# Patient Record
Sex: FEMALE | Race: WHITE | Employment: OTHER | URBAN - METROPOLITAN AREA
[De-identification: names, ages, dates, MRNs, and addresses within clinical notes are randomized per-mention and may not be internally consistent; named-entity substitution may affect disease eponyms.]

---

## 2017-04-04 ENCOUNTER — OFFICE VISIT (OUTPATIENT)
Dept: HEMATOLOGY/ONCOLOGY | Facility: HOSPITAL | Age: 82
End: 2017-04-04
Attending: INTERNAL MEDICINE
Payer: MEDICARE

## 2017-04-04 VITALS
SYSTOLIC BLOOD PRESSURE: 174 MMHG | RESPIRATION RATE: 18 BRPM | DIASTOLIC BLOOD PRESSURE: 77 MMHG | WEIGHT: 125.81 LBS | TEMPERATURE: 99 F | OXYGEN SATURATION: 94 % | HEART RATE: 72 BPM

## 2017-04-04 DIAGNOSIS — C85.80 LARGE CELL LYMPHOMA (HCC): Primary | ICD-10-CM

## 2017-04-04 RX ORDER — MELATONIN
325 2 TIMES DAILY WITH MEALS
COMMUNITY

## 2017-04-04 RX ORDER — ENOXAPARIN SODIUM 100 MG/ML
40 INJECTION SUBCUTANEOUS DAILY
COMMUNITY
Start: 2017-03-06 | End: 2017-05-02

## 2017-04-04 RX ORDER — LISINOPRIL 20 MG/1
40 TABLET ORAL DAILY
COMMUNITY
Start: 2017-01-28 | End: 2017-07-18

## 2017-04-04 RX ORDER — DILTIAZEM HYDROCHLORIDE 240 MG/1
240 CAPSULE, COATED, EXTENDED RELEASE ORAL DAILY
COMMUNITY
Start: 2017-03-17

## 2017-04-04 RX ORDER — FUROSEMIDE 20 MG/1
40 TABLET ORAL DAILY
COMMUNITY
Start: 2017-02-20 | End: 2017-06-27

## 2017-04-04 RX ORDER — MONTELUKAST SODIUM 10 MG/1
10 TABLET ORAL NIGHTLY
COMMUNITY
Start: 2017-02-27 | End: 2017-05-02

## 2017-04-04 RX ORDER — HYDROCODONE BITARTRATE AND ACETAMINOPHEN 5; 325 MG/1; MG/1
1 TABLET ORAL EVERY 6 HOURS PRN
COMMUNITY
Start: 2017-03-08

## 2017-04-04 RX ORDER — ASPIRIN 81 MG
100 TABLET, DELAYED RELEASE (ENTERIC COATED) ORAL 2 TIMES DAILY
COMMUNITY
End: 2017-05-02

## 2017-04-04 NOTE — PROGRESS NOTES
Patient is here for MD consult. Pt presented with syncopal episode and positive hemoccult. Ct scan showed a mass in the right colon. Right hemicolectomy was done on 3/3. Here to discuss tx options and POC.         Education Record    Learner:  Patient    Zach Ernandez

## 2017-04-05 ENCOUNTER — TELEPHONE (OUTPATIENT)
Dept: HEMATOLOGY/ONCOLOGY | Facility: HOSPITAL | Age: 82
End: 2017-04-05

## 2017-04-05 PROBLEM — I50.30 DIASTOLIC CHF (HCC): Status: ACTIVE | Noted: 2017-04-05

## 2017-04-05 PROBLEM — I10 ESSENTIAL HYPERTENSION: Status: ACTIVE | Noted: 2017-04-05

## 2017-04-05 PROBLEM — C85.89: Status: ACTIVE | Noted: 2017-04-05

## 2017-04-05 PROBLEM — Z90.49 S/P RIGHT HEMICOLECTOMY: Status: ACTIVE | Noted: 2017-04-05

## 2017-04-05 PROBLEM — E11.9 TYPE II DIABETES MELLITUS (HCC): Status: ACTIVE | Noted: 2017-04-05

## 2017-04-05 PROBLEM — D50.0 IRON DEFICIENCY ANEMIA DUE TO CHRONIC BLOOD LOSS: Status: ACTIVE | Noted: 2017-04-05

## 2017-04-05 RX ORDER — POTASSIUM CHLORIDE 750 MG/1
10 TABLET, FILM COATED, EXTENDED RELEASE ORAL 2 TIMES DAILY
Qty: 30 TABLET | Refills: 5 | Status: CANCELLED | OUTPATIENT
Start: 2017-04-05

## 2017-04-05 NOTE — TELEPHONE ENCOUNTER
Test(s) completed: CBC, CMP  Results: per Dr Kayden Robert, \"Let them know Hgb is better.  Stay on iron.  Potassium is a bit low.  Due to furosemide.  Please order kdur 10 meq and have her take two a day\"  Plan: Left message for daughter to call back

## 2017-04-06 ENCOUNTER — TELEPHONE (OUTPATIENT)
Dept: HEMATOLOGY/ONCOLOGY | Facility: HOSPITAL | Age: 82
End: 2017-04-06

## 2017-04-06 DIAGNOSIS — C85.80 LARGE CELL LYMPHOMA (HCC): Primary | ICD-10-CM

## 2017-04-06 RX ORDER — POTASSIUM CHLORIDE 750 MG/1
10 TABLET, EXTENDED RELEASE ORAL 2 TIMES DAILY
Qty: 60 TABLET | Refills: 0 | Status: SHIPPED
Start: 2017-04-06 | End: 2017-07-18

## 2017-04-06 NOTE — CONSULTS
[facility]    PATIENT'S NAME: Deshawn Del Real   CONSULTING PHYSICIAN: Peri Dominique M.D.    PATIENT ACCOUNT #: [de-identified] LOCATION: [address] [place] [address] RECORD #: [id number] YOB: 1928   CONSULTATION DATE: 04/04/2017       CANCER C with her daughter and son-in-law, who happens to be Dr. Kirsten Wong on the staff here at THE [facility]. She has been eating well. Overall, she has lost weight. She was 140 pounds last summer. She is about 125 pounds now. She is weak but walking.   She is doing phy clear to me whether she took tamoxifen for a period of time. The family thinks she may have. She has had multiple skin lesions biopsied over the years. She has a history of iron-deficiency anemia with a current episode.   She has a history of dyslipidemi review of systems is otherwise unremarkable. PHYSICAL EXAMINATION:    GENERAL:  Well-developed, well-nourished female in no acute distress. VITAL SIGNS:  Performance status is 1. Her weight is 125 pounds.   Blood pressure is 174/77, pulse 72, respira chemotherapy given her age. She had previously been reasonably healthy.   I would be very reluctant to give her R-CHOP in this situation given the fact that she is elderly, has a history of diastolic heart failure, and has prior history of atrial fibrillat

## 2017-04-06 NOTE — TELEPHONE ENCOUNTER
Daughter returning call re: Mother test results. Per prior message:  Results: per Dr Savi Smith, \"Let them know Hgb is better.  Stay on iron.  Potassium is a bit low. Due to furosemide.  To take kdur 10 meq BID. Script sent to [facility].  Verb

## 2017-04-07 NOTE — TELEPHONE ENCOUNTER
Pt's daughter states she got the message and was able to get [employer] script filled.

## 2017-04-11 ENCOUNTER — HOSPITAL ENCOUNTER (OUTPATIENT)
Dept: NUCLEAR MEDICINE | Facility: HOSPITAL | Age: 82
Discharge: HOME | End: 2017-04-11
Attending: INTERNAL MEDICINE
Payer: MEDICARE

## 2017-04-11 DIAGNOSIS — C85.80 LARGE CELL LYMPHOMA (HCC): ICD-10-CM

## 2017-04-11 PROCEDURE — 82962 GLUCOSE BLOOD TEST: CPT

## 2017-04-11 PROCEDURE — 78815 PET IMAGE W/CT SKULL-THIGH: CPT

## 2017-04-25 ENCOUNTER — APPOINTMENT (OUTPATIENT)
Dept: HEMATOLOGY/ONCOLOGY | Facility: HOSPITAL | Age: 82
End: 2017-04-25
Payer: MEDICARE

## 2017-05-01 RX ORDER — POTASSIUM CHLORIDE 750 MG/1
TABLET, FILM COATED, EXTENDED RELEASE ORAL
Qty: 60 TABLET | Refills: 5 | Status: SHIPPED | OUTPATIENT
Start: 2017-05-01 | End: 2017-07-18

## 2017-05-02 ENCOUNTER — OFFICE VISIT (OUTPATIENT)
Dept: HEMATOLOGY/ONCOLOGY | Facility: HOSPITAL | Age: 82
End: 2017-05-02
Attending: INTERNAL MEDICINE
Payer: MEDICARE

## 2017-05-02 VITALS
HEART RATE: 58 BPM | OXYGEN SATURATION: 92 % | SYSTOLIC BLOOD PRESSURE: 159 MMHG | RESPIRATION RATE: 18 BRPM | TEMPERATURE: 100 F | DIASTOLIC BLOOD PRESSURE: 64 MMHG | WEIGHT: 126 LBS

## 2017-05-02 DIAGNOSIS — D50.0 IRON DEFICIENCY ANEMIA DUE TO CHRONIC BLOOD LOSS: Primary | ICD-10-CM

## 2017-05-02 DIAGNOSIS — C85.89 LARGE CELL LYMPHOMA OF EXTRANODAL AND SOLID ORGAN SITES (HCC): ICD-10-CM

## 2017-05-02 NOTE — PROGRESS NOTES
Patient is here for MD f/u for Lymphoma. Pt has been having daily night sweats and right upper quadrant abdominal pain. No nausea or vomiting. BM are normal. Pt had a PET on 4/11. Here with family to discuss POC.        Education Record    Learner:  Patient

## 2017-05-04 NOTE — PROGRESS NOTES
[facility]    PATIENT'S NAME: Liliana Shone   ATTENDING PHYSICIAN: Grant Johnson M.D.    PATIENT ACCOUNT #: [de-identified] LOCATION: [address] [place] [address] RECORD #: [id number] YOB: 1928   DATE OF SERVICE: 05/02/2017       CANCER CENT 1.  Her weight is 126 pounds. Blood pressure 159/64, pulse 58, respiratory rate 20, temperature 99.6. HEENT:  Unremarkable. She has pink conjunctivae, anicteric sclerae. Pharynx without lesions.     LYMPHATICS:  She has no cervical, supraclavicular, or possible, though it is hard to predict at the present time. They are interested primarily in palliative issues. They are going to continue to talk to their mother.   Her 2 daughters and her son-in-law seem to be more inclined to consider treatment, but sh

## 2017-06-27 ENCOUNTER — OFFICE VISIT (OUTPATIENT)
Dept: HEMATOLOGY/ONCOLOGY | Facility: HOSPITAL | Age: 82
End: 2017-06-27
Attending: INTERNAL MEDICINE
Payer: MEDICARE

## 2017-06-27 VITALS
DIASTOLIC BLOOD PRESSURE: 72 MMHG | TEMPERATURE: 98 F | WEIGHT: 121.5 LBS | RESPIRATION RATE: 18 BRPM | SYSTOLIC BLOOD PRESSURE: 154 MMHG

## 2017-06-27 DIAGNOSIS — R18.0 MALIGNANT ASCITES: ICD-10-CM

## 2017-06-27 DIAGNOSIS — C85.89 LARGE CELL LYMPHOMA OF EXTRANODAL AND SOLID ORGAN SITES (HCC): Primary | ICD-10-CM

## 2017-06-27 DIAGNOSIS — R60.0 PEDAL EDEMA: ICD-10-CM

## 2017-06-27 RX ORDER — TORSEMIDE 20 MG/1
20 TABLET ORAL DAILY
Qty: 30 TABLET | Refills: 5 | Status: SHIPPED | OUTPATIENT
Start: 2017-06-27

## 2017-06-27 NOTE — PROGRESS NOTES
Patient is here for MD f/u for Lymphoma. Daughter and son state pt c/o increase pain in right side of back and lower abdomen. Taking Tylenol for pain. + edema in lower extremities. Appetite and energy have decreased.  Pt had been going to outpatient rehab a

## 2017-07-03 NOTE — PROGRESS NOTES
[address]    PATIENT'S NAME: Allison Milton   ATTENDING PHYSICIAN: Rad Mcqueen M.D.    PATIENT ACCOUNT #: [de-identified] LOCATION: [address] [place] [address] RECORD #: [id number] YOB: 1928   DATE OF SERVICE: 06/27/2017       CANCER CENT conjunctivae, anicteric sclerae. Pharynx is without lesions. LYMPHATICS:  She has no cervical, supraclavicular or axillary nodes. LUNGS:  Generally clear lung fields bilaterally.   HEART:  Normal.  ABDOMEN:  Mild generalized fullness and some evidence

## 2017-07-06 DIAGNOSIS — C85.89 LARGE CELL LYMPHOMA OF EXTRANODAL AND SOLID ORGAN SITES (HCC): Primary | ICD-10-CM

## 2017-07-07 ENCOUNTER — SOCIAL WORK SERVICES (OUTPATIENT)
Dept: HEMATOLOGY/ONCOLOGY | Facility: HOSPITAL | Age: 82
End: 2017-07-07

## 2017-07-18 ENCOUNTER — TELEPHONE (OUTPATIENT)
Dept: HEMATOLOGY/ONCOLOGY | Facility: HOSPITAL | Age: 82
End: 2017-07-18

## (undated) NOTE — MR AVS SNAPSHOT
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 and click on the Sign Up Now link in the [employer] box. Enter your [employer] Activation Code exactly as it appears below along with your Zip Code and Date of Birth to complete the sign-up process.  If you do

## (undated) NOTE — MR AVS SNAPSHOT
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   Procedure                               Abnormality         Sta Result Summary for CBC W/ DIFFERENTIAL      Component Results     Component Value Flag Ref Range Units Status    WBC 8.7  4.0-13.0  x10(3) uL Final    RBC 4.47  3.80-5.10  x10(6)uL Final    HGB 11.3 (L) 12.0-16.0  g/dL Final    HCT 37.5  34.0-50.0  % Final